# Patient Record
Sex: FEMALE
[De-identification: names, ages, dates, MRNs, and addresses within clinical notes are randomized per-mention and may not be internally consistent; named-entity substitution may affect disease eponyms.]

---

## 2020-09-23 ENCOUNTER — NON-APPOINTMENT (OUTPATIENT)
Age: 30
End: 2020-09-23

## 2020-09-23 PROBLEM — Z00.00 ENCOUNTER FOR PREVENTIVE HEALTH EXAMINATION: Status: ACTIVE | Noted: 2020-09-23

## 2020-09-24 ENCOUNTER — APPOINTMENT (OUTPATIENT)
Dept: OTOLARYNGOLOGY | Facility: CLINIC | Age: 30
End: 2020-09-24
Payer: COMMERCIAL

## 2020-09-24 ENCOUNTER — TRANSCRIPTION ENCOUNTER (OUTPATIENT)
Age: 30
End: 2020-09-24

## 2020-09-24 VITALS
OXYGEN SATURATION: 98 % | BODY MASS INDEX: 18.42 KG/M2 | TEMPERATURE: 98.3 F | SYSTOLIC BLOOD PRESSURE: 108 MMHG | HEIGHT: 66.14 IN | HEART RATE: 84 BPM | WEIGHT: 114.64 LBS | DIASTOLIC BLOOD PRESSURE: 71 MMHG

## 2020-09-24 DIAGNOSIS — Z78.9 OTHER SPECIFIED HEALTH STATUS: ICD-10-CM

## 2020-09-24 DIAGNOSIS — H93.12 TINNITUS, LEFT EAR: ICD-10-CM

## 2020-09-24 DIAGNOSIS — H90.5 UNSPECIFIED SENSORINEURAL HEARING LOSS: ICD-10-CM

## 2020-09-24 PROCEDURE — 92557 COMPREHENSIVE HEARING TEST: CPT

## 2020-09-24 PROCEDURE — 92550 TYMPANOMETRY & REFLEX THRESH: CPT

## 2020-09-24 PROCEDURE — 92504 EAR MICROSCOPY EXAMINATION: CPT

## 2020-09-24 PROCEDURE — 99204 OFFICE O/P NEW MOD 45 MIN: CPT | Mod: 25

## 2020-09-24 RX ORDER — CETIRIZINE HCL 10 MG
TABLET ORAL
Refills: 0 | Status: ACTIVE | COMMUNITY

## 2020-09-24 NOTE — ASSESSMENT
[FreeTextEntry1] : The pt has already had an MRI (results peding) and has an appt w/ otology at [facility] tomorrow. I offered a trial of steroids for her tinnitus but expressed that review of the MR results is important so she will wait until tomorrow. Discussed HA options. Annual audios.

## 2020-09-24 NOTE — DATA REVIEWED
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 % AS, 10% AD

## 2022-06-15 NOTE — HISTORY OF PRESENT ILLNESS
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 This is unchanging. She does not use amplificaiton. \par Birth hx: normal on-time vaginal\par NBHS: unknown\par Maternal or child  infections: none known\par Jaundice requiring phototx: no\par NICU stay: no\par Hx IV antibiotics/chemo: no\par Known related syndrome: no\par Known pt hx of head trauma, joint pain, hypothyroidism, visual issues, or hematuria: no \par FHx of progressive hearing loss, sudden death, renal problems, or progressive visual loss: no\par She now presents bcz 3 wks ago she developed nonpulsatile high-pitched tinnitus in her L ear that has been unremitting; nothing makes it better or worse. Possible slight hearing loss on that side as well. She saw an ENT at [facility] who ordered an MRI (done yesterday) but she hasn't had an audio yet. She also feels that she may have some hearing loss on the left and she is concerned that this is wax-related. Doesn't use qtips. \par \par Covid-19 screening questions: \par   Sick contacts: no\par   Recent international travel: no\par   Shortness of breath: no\par   New or productive cough: no\par   Fevers/chills/night sweats: no\par   COVID-19 testing: none\par 
self-care/home management